# Patient Record
Sex: FEMALE | Race: WHITE | NOT HISPANIC OR LATINO | URBAN - METROPOLITAN AREA
[De-identification: names, ages, dates, MRNs, and addresses within clinical notes are randomized per-mention and may not be internally consistent; named-entity substitution may affect disease eponyms.]

---

## 2020-04-15 ENCOUNTER — APPOINTMENT (OUTPATIENT)
Dept: URBAN - METROPOLITAN AREA CLINIC 186 | Age: 60
Setting detail: DERMATOLOGY
End: 2020-04-15

## 2020-04-15 DIAGNOSIS — L72.0 EPIDERMAL CYST: ICD-10-CM

## 2020-04-15 PROCEDURE — 10061 I&D ABSCESS COMP/MULTIPLE: CPT

## 2020-04-15 PROCEDURE — OTHER COUNSELING: OTHER

## 2020-04-15 PROCEDURE — OTHER INCISION AND DRAINAGE: OTHER

## 2020-04-15 PROCEDURE — OTHER CONSULTATION EXCISION: OTHER

## 2020-04-15 PROCEDURE — OTHER MIPS QUALITY: OTHER

## 2020-04-15 PROCEDURE — OTHER ORDER TESTS: OTHER

## 2020-04-15 PROCEDURE — OTHER TREATMENT REGIMEN: OTHER

## 2020-04-15 PROCEDURE — OTHER OTHER (SELF PAY): OTHER

## 2020-04-15 ASSESSMENT — LOCATION DETAILED DESCRIPTION DERM: LOCATION DETAILED: RIGHT POSTERIOR SHOULDER

## 2020-04-15 ASSESSMENT — LOCATION SIMPLE DESCRIPTION DERM: LOCATION SIMPLE: RIGHT SHOULDER

## 2020-04-15 ASSESSMENT — LOCATION ZONE DERM: LOCATION ZONE: ARM

## 2020-04-15 NOTE — PROCEDURE: INCISION AND DRAINAGE
Curette: No
Wound Care: Petrolatum
Lesion Type: Cyst
Consent was obtained and risks were reviewed including but not limited to delayed wound healing, infection, need for multiple I and D's, and pain.
Curette Text (Optional): After the contents were expressed a curette was used to partially remove the cyst wall.
Suture Text: The incision was partially closed with
Packing?: iodoform packing strips
Method: 11 blade
Drainage Type?: bloody and cyst-like
Detail Level: Detailed
Anesthesia Volume In Cc: 3
Post-Care Instructions: I reviewed with the patient in detail post-care instructions. Patient should keep wound covered and call the office should any redness, pain, swelling or worsening occur.
Dressing: dry sterile dressing
Epidermal Closure: simple interrupted
Epidermal Sutures: 4-0 Ethilon
I&D Type: complicated
Drainage Amount?: moderate
Size Of Lesion In Cm (Optional But May Be Required For Some Insurances): 0
Anesthesia Type: 1% lidocaine with epinephrine

## 2020-04-15 NOTE — PROCEDURE: TREATMENT REGIMEN
Plan: Instructed patient to remove packing in 2 days. Recommended warm compression to the area
Detail Level: Zone